# Patient Record
(demographics unavailable — no encounter records)

---

## 2025-06-10 NOTE — HISTORY OF PRESENT ILLNESS
[FreeTextEntry1] : Pt here for follow up after an ER visit last week. One day last week felt fast heart rhythm, palpitations. Called the ambulance and they did an EKG which was normal. Pt still was feeling unwell and he drove to the hospital.  Saw Cardiologist yesterday and pt now wearing a Holter monitor. Will be getting echo and stress test soon. Pt feeling better now with no palpitations- just feels tired.

## 2025-06-10 NOTE — PLAN
[FreeTextEntry1] : Palpitations- So far workup has been normal. Saw Cardiologist- will get echo and stress test soon and also is wearing a Holter monitor. check lipids and A1C.  Hypothyroid- check TSH Bloodwork reviewed from GI- B12 level 326; advised to take B12 supplement.